# Patient Record
Sex: FEMALE | Race: WHITE | URBAN - METROPOLITAN AREA
[De-identification: names, ages, dates, MRNs, and addresses within clinical notes are randomized per-mention and may not be internally consistent; named-entity substitution may affect disease eponyms.]

---

## 2023-08-20 DIAGNOSIS — N63.21 BREAST LUMP ON LEFT SIDE AT 2 O'CLOCK POSITION: Primary | ICD-10-CM

## 2023-08-20 PROBLEM — N64.4 MASTODYNIA OF LEFT BREAST: Status: ACTIVE | Noted: 2023-08-20

## 2023-08-20 PROBLEM — N60.02 CYST OF LEFT BREAST: Status: ACTIVE | Noted: 2023-08-20

## 2023-08-20 PROBLEM — Z80.3 FAMILY HISTORY OF MALIGNANT NEOPLASM OF BREAST: Status: ACTIVE | Noted: 2023-08-20

## 2023-08-21 NOTE — PROGRESS NOTES
[facility]  MD Shiela Short, NP-C    Chief Complaint:   Darlene Wade is a 57 y.o. female presenting today for  6 month follow up. She is due for Mammogram and Ultrasound  She reports no interval changes on her self-breast examination.     History of Present Illness:   Mrs. Satterley first presented on February 22, 2023 with a left breast lump that on imaging was found to be consistent with benign fat necorsis and the recommendation was made to follow the area conservatively. She was also noted with a benign cyst. MD::: Stacy Duckworth MD.    Past Medical History:   Diagnosis Date    Breast lump on left side at 2 o'clock position 8/20/2023    Cyst of left breast 8/20/2023    Family history of malignant neoplasm of breast 8/20/2023    Mastodynia of left breast 8/20/2023      History reviewed. No pertinent surgical history.     Current Outpatient Medications:     busPIRone (BUSPAR) 5 MG Tab, Take 5-10 mg by mouth 2 (two) times daily as needed., Disp: , Rfl:     levothyroxine (SYNTHROID) 50 MCG tablet, Take 50 mcg by mouth every morning., Disp: , Rfl:     LO LOESTRIN FE 1 mg-10 mcg (24)/10 mcg (2) Tab, Take 1 tablet by mouth., Disp: , Rfl:    Review of patient's allergies indicates:   Allergen Reactions    Sulfa (sulfonamide antibiotics) Rash      Social History     Tobacco Use    Smoking status: Never    Smokeless tobacco: Never   Substance Use Topics    Alcohol use: Not on file      Family History   Problem Relation Age of Onset    Parkinsonism Mother     Breast cancer Mother 82    Ovarian cancer Neg Hx         Review of Systems   Integumentary:  Negative for color change, rash, mole/lesion, breast mass, breast discharge and breast tenderness.   Breast: Negative for mass and tenderness       Physical Exam   HENT:   Head: Normocephalic.   Pulmonary/Chest: Right breast exhibits no inverted nipple, no mass, no nipple discharge, no skin change and no  tenderness. Left breast exhibits no inverted nipple, no mass, no nipple discharge, no skin change and no tenderness. No breast swelling.   Genitourinary: No breast swelling.   Musculoskeletal: Lymphadenopathy:      Upper Body:      Right upper body: No supraclavicular or axillary adenopathy.      Left upper body: No supraclavicular or axillary adenopathy.     Neurological: She is alert.        MAMMOGRAM REPORT: The patient has had left breast core biopsies.There are scattered fibroglandular elements noted. Scattered, circumscribed, benign-appearing masses are demonstrated. Benign-type calcifications are identified. No spiculated mass, architectural distortion, or suspicious calcifications are identified in either breast.         Ultrasound: Left: Targeted ultrasound of the 2 o'clock N7 position of the left breast was performed for follow-up. The previously seen area of increased echogenicity is no longer visualized. No discrete solid or cystic lesion is identified in the area. IMPRESSION: No mammographic evidence of malignancy in either breast. Unremarkable targeted ultrasound of the 2 o'clock N7 position of the left breast. Follow-up mammography is recommended in one year.      NOTE:::We viewed her films together at today's visit.  We discussed the multiple views obtained and the important findings.  Even benign changes were mentioned and her questions were answered.  She knows that she may receive a formal letter or report from the Radiologist.  She is to contact us if she has questions.        Assessment/Plan  1. Breast lump on left side at 2 o'clock position  Assessment & Plan:  We reviewed our findings today and her questions were answered.  She understands that her imaging and exams have remained stable (and show nothing concerning).  She is comfortable being followed in a conservative fashion.      She understands the importance of monthly self-breast examination and knows to report any and all changes as  they occur.        2. Cyst of left breast  Assessment & Plan:  We discussed our Fibrocystic Mastopathy Protocol in detail. She should take Vitamin E 800 IU everyday x 3 months or until non-tender then can stop Vitamin E vs. continue daily at 400 IU.  The use of ice packs or warms soaks to tender area of the breast may also be of some benefit.  If warm soaks help her tenderness - She can use Aspercreme (unless allergic to Aspirin) on the affected area.  Ibuprofen (if no contraindications) at 800 mg three times per day for 5 days can also relieve many symptoms associated with swollen or inflamed tissue.  She can repeat Ibuprofen for 5 days, but then should be off for 5 days as it may cause gastric upset.  It is a good idea to wear a tight bra during the day and night to minimize movement of the tender area (Sports Bras work well).  Evening Primrose Oil can be bought over the counter and used at a dose of 3000 mg per day to help with any breast pain/tenderness not improved by implementing the above measures.        3. Mastodynia of left breast  Assessment & Plan:  We discussed our fibrocystic mastopathy protocol in detail. She knows that if she follows this protocol - that her symptoms should improve.  We discussed how breast pain is usually not associated with breast cancer, however, pain can be the presenting symptom with some cancers (but this could be coincidental). Still, if her pain does not improve in 8-12 weeks she should call us back for additional recommendations.        4. Family history of malignant neoplasm of breast  Assessment & Plan:  We discussed her family history and how it could impact her own future risks.  We discussed family vs. genetic history and the importance and implications of each.  Genetic Counseling/Testing was offered, and all questions answered to her satisfaction.  She knows that as additional family members are diagnosed - she will need to let us know as this may change follow up and  imaging recommendations.    We had a discussion concerning Breast Cancer Risk Reduction and current NCCN Guidelines. She knows that her risk can be lowered slightly with a healthy lifestyle and minimal ETOH use. Being physically active will also help. She should reduce or stay away from OCPs and HRT as possible.   Long discussion with her about her family history and the increased risk that is attributable.  We discussed Genetic Counseling/Testing at length and information was given to address these.   Her Lifetime Risk is only 13.48% based on the [employer] Tool. The population risk is 8.15%- so she not at a very high risk.   Her family and personal history were confirmed, and I believe this risk to be true based on its limitations.  She knows that there are certain elements that increase/decrease her risk that are not amendable to .  This risk is less than 20% and does not meet the threshold for additional annual imaging with MRI.  She will also not need to be followed in the High-Risk setting and can be seen on an annual basis.             Medical Decision Making:  It is my impression that this patient suffers all conditions contained in this medical document.  Each of these conditions did affect our plan of care and my medical decision making today.  It is my opinion that the medical decision making concerning this patient was of moderate difficulty based on the aforementioned conditions.  Any further recommendations will be communicated to the patient by me.  I have reviewed and verified her allergies, list of medications, medical and surgical histories, social history, and a pertinent review of symptoms.      Follow up:  1 year and prn    For:  OANH SALCIDO) at

## 2023-08-21 NOTE — ASSESSMENT & PLAN NOTE
We discussed her family history and how it could impact her own future risks.  We discussed family vs. genetic history and the importance and implications of each.  Genetic Counseling/Testing was offered, and all questions answered to her satisfaction.  She knows that as additional family members are diagnosed - she will need to let us know as this may change follow up and imaging recommendations.    We had a discussion concerning Breast Cancer Risk Reduction and current NCCN Guidelines. She knows that her risk can be lowered slightly with a healthy lifestyle and minimal ETOH use. Being physically active will also help. She should reduce or stay away from OCPs and HRT as possible.   Long discussion with her about her family history and the increased risk that is attributable.  We discussed Genetic Counseling/Testing at length and information was given to address these.   Her Lifetime Risk is only 13.48% based on the [employer] Tool. The population risk is 8.15%- so she not at a very high risk.   Her family and personal history were confirmed, and I believe this risk to be true based on its limitations.  She knows that there are certain elements that increase/decrease her risk that are not amendable to .  This risk is less than 20% and does not meet the threshold for additional annual imaging with MRI.  She will also not need to be followed in the High-Risk setting and can be seen on an annual basis.

## 2023-08-31 ENCOUNTER — OFFICE VISIT (OUTPATIENT)
Dept: SURGERY | Facility: CLINIC | Age: 58
End: 2023-08-31
Payer: MEDICAID

## 2023-08-31 VITALS — HEIGHT: 61 IN | BODY MASS INDEX: 24.55 KG/M2 | WEIGHT: 130 LBS

## 2023-08-31 DIAGNOSIS — Z80.3 FAMILY HISTORY OF MALIGNANT NEOPLASM OF BREAST: ICD-10-CM

## 2023-08-31 DIAGNOSIS — N64.4 MASTODYNIA OF LEFT BREAST: ICD-10-CM

## 2023-08-31 DIAGNOSIS — N63.21 BREAST LUMP ON LEFT SIDE AT 2 O'CLOCK POSITION: ICD-10-CM

## 2023-08-31 DIAGNOSIS — N60.02 CYST OF LEFT BREAST: ICD-10-CM

## 2023-08-31 PROCEDURE — 3008F PR BODY MASS INDEX (BMI) DOCUMENTED: ICD-10-PCS | Mod: CPTII,S$GLB,, | Performed by: NURSE PRACTITIONER

## 2023-08-31 PROCEDURE — 1160F RVW MEDS BY RX/DR IN RCRD: CPT | Mod: CPTII,S$GLB,, | Performed by: NURSE PRACTITIONER

## 2023-08-31 PROCEDURE — 1159F PR MEDICATION LIST DOCUMENTED IN MEDICAL RECORD: ICD-10-PCS | Mod: CPTII,S$GLB,, | Performed by: NURSE PRACTITIONER

## 2023-08-31 PROCEDURE — 99213 PR OFFICE/OUTPT VISIT, EST, LEVL III, 20-29 MIN: ICD-10-PCS | Mod: S$GLB,,, | Performed by: NURSE PRACTITIONER

## 2023-08-31 PROCEDURE — 99213 OFFICE O/P EST LOW 20 MIN: CPT | Mod: S$GLB,,, | Performed by: NURSE PRACTITIONER

## 2023-08-31 PROCEDURE — 1159F MED LIST DOCD IN RCRD: CPT | Mod: CPTII,S$GLB,, | Performed by: NURSE PRACTITIONER

## 2023-08-31 PROCEDURE — 1160F PR REVIEW ALL MEDS BY PRESCRIBER/CLIN PHARMACIST DOCUMENTED: ICD-10-PCS | Mod: CPTII,S$GLB,, | Performed by: NURSE PRACTITIONER

## 2023-08-31 PROCEDURE — 3008F BODY MASS INDEX DOCD: CPT | Mod: CPTII,S$GLB,, | Performed by: NURSE PRACTITIONER

## 2023-08-31 RX ORDER — LEVOTHYROXINE SODIUM 50 UG/1
50 TABLET ORAL EVERY MORNING
COMMUNITY
Start: 2023-08-15

## 2023-08-31 RX ORDER — NORETHINDRONE ACETATE AND ETHINYL ESTRADIOL, ETHINYL ESTRADIOL AND FERROUS FUMARATE 1MG-10(24)
1 KIT ORAL
COMMUNITY
Start: 2023-08-14

## 2023-08-31 RX ORDER — BUSPIRONE HYDROCHLORIDE 5 MG/1
5-10 TABLET ORAL 2 TIMES DAILY PRN
COMMUNITY
Start: 2023-08-15

## 2024-06-20 ENCOUNTER — CLINICAL SUPPORT (OUTPATIENT)
Dept: AUDIOLOGY | Facility: HOSPITAL | Age: 59
End: 2024-06-20
Payer: MEDICAID

## 2024-06-20 DIAGNOSIS — H81.90 DISORDER OF VESTIBULAR FUNCTION, UNSPECIFIED LATERALITY: Primary | ICD-10-CM

## 2024-06-20 PROCEDURE — 92567 TYMPANOMETRY: CPT | Performed by: AUDIOLOGIST-HEARING AID FITTER

## 2024-06-20 PROCEDURE — 92537 CALORIC VSTBLR TEST W/REC: CPT | Performed by: AUDIOLOGIST-HEARING AID FITTER

## 2024-06-20 PROCEDURE — 92540 BASIC VESTIBULAR EVALUATION: CPT | Performed by: AUDIOLOGIST-HEARING AID FITTER

## 2024-06-20 NOTE — PROGRESS NOTES
Vestibular Evaluation      Patient is a 58 year old female presenting with symptoms of episodic vertigo.  Mrs. Wade reports the onset of symptoms began approximately 8 weeks prior due to unknown etiology.  The initial attack is described as severe vertigo, nausea and syncopal sensation which occurred suddenly and without warning.  The duration of this initial episode occurred for hours. Mrs. Wade reports the episodes have since diminished in frequency and severity.  She may experience slight vertigo with changes in position and/or head movement which may last only seconds.   A change in hearing and/or tinnitus prior or during an attack has also been denied at this time.  She also denies a recent change in medication and/or new medical diagnosis. Of interest, Mrs. Wade denies any symptoms until experiencing a major life event (I.e. death of her mother).       Otoscopy revealed clear EACs, bilaterally.   Tympanometry testing revealed a Type As tympanogram for the right ear indicative of slightly, reduced mobility of the TM; a Type A tympanogram was noted for the left ear indicative of normal middle ear function.      Vestibular Results:    Spontaneous: No spontaneous nystagmus noted for vision and vision denied condition.    Gaze:   No gaze-evoked nystagmus.    Saccades:   Normal saccade velocity, latency and accuracy.   Tracking: Normal tracking accuracy and gain.  OPK:    No asymmetry noted.   Positionals: No positional nystagmus noted for all test conditions with vision denied.  Positioning: No paroxysmal positional nystagmus noted.   Calorics: Within normal limits    Caloric vestibular responses: RC 15, RW 15, LW 12, LC 12 deg/s.      Interpretations:      This patient's videonystagmogram was normal.  Oculomotor testing was within normal limits. Positional testing did not reveal any nystagmus for all test conditions with vision denied. Positioning testing was negative for canalith involvement,  bilaterally.  The caloric irrigation test revealed symmetrical responses for air irrigations, bilaterally. Todays findings indicative of normal peripheral function, bilaterally.      Recommendations:        Return to referring ENT for further follow up       Qaun Link.  Clinical Audiologist

## 2024-08-26 DIAGNOSIS — N63.21 BREAST LUMP ON LEFT SIDE AT 2 O'CLOCK POSITION: Primary | ICD-10-CM

## 2024-09-03 NOTE — PROGRESS NOTES
[facility]  Anam Jolly MD, FACS  Shiela Thomas NP-YA      Date of Service: 9/4/2024      Chief Complaint:   Darlene Wade is a 58 y.o. female presenting today for her annual evaluation.  She is due for a mammogram. She reports no interval changes.     History of Present Illness:   Mrs. Satterley first presented on February 22, 2023 with a left breast lump that on imaging was found to be consistent with benign fat necorsis and the recommendation was made to follow the area conservatively. She was also noted with a benign cyst. MD::: Stacy Duckworth MD.     Past Medical History:   Diagnosis Date    Breast lump on left side at 2 o'clock position 8/20/2023    Cyst of left breast 8/20/2023    Family history of malignant neoplasm of breast 8/20/2023    Mastodynia of left breast 8/20/2023      Past Surgical History:   Procedure Laterality Date    BREAST BIOPSY Left         Current Outpatient Medications:     busPIRone (BUSPAR) 5 MG Tab, Take 5-10 mg by mouth 2 (two) times daily as needed., Disp: , Rfl:     levothyroxine (SYNTHROID) 50 MCG tablet, Take 50 mcg by mouth every morning., Disp: , Rfl:     LO LOESTRIN FE 1 mg-10 mcg (24)/10 mcg (2) Tab, Take 1 tablet by mouth., Disp: , Rfl:    Review of patient's allergies indicates:   Allergen Reactions    Sulfa (sulfonamide antibiotics) Rash      Social History     Tobacco Use    Smoking status: Never    Smokeless tobacco: Never   Substance Use Topics    Alcohol use: Not on file      Family History   Problem Relation Name Age of Onset    Parkinsonism Mother Kailee Wade     Breast cancer Mother Kailee Wade 82    Ovarian cancer Neg Hx          Review of Systems   Integumentary:  Negative for color change, rash, mole/lesion, breast mass, breast discharge and breast tenderness.   Breast: Negative for mass and tenderness       Physical Exam   HENT:   Head: Normocephalic.   Pulmonary/Chest: Right breast exhibits no inverted  nipple, no mass, no nipple discharge, no skin change and no tenderness. Left breast exhibits no inverted nipple, no mass, no nipple discharge, no skin change and no tenderness. No breast swelling.   Genitourinary: No breast swelling.   Musculoskeletal: Lymphadenopathy:      Upper Body:      Right upper body: No supraclavicular or axillary adenopathy.      Left upper body: No supraclavicular or axillary adenopathy.     Neurological: She is alert.        MAMMOGRAM REPORT:FINDINGS: There are scattered fibroglandular elements noted. The patient has undergone core biopsy on the left. There are no suspicious masses or suspicious calcifications seen to suggest malignancy. Scattered, circumscribed, benign-appearing masses are demonstrated.  IMPRESSION: No evidence of malignancy. No significant change when compared to previous exam. Follow-up mammography is recommended in one year.  ASSESSMENT and PLAN OF CARE     1. Breast lump on left side at 2 o'clock position  Assessment & Plan:  We reviewed our findings today and her questions were answered.  She understands that her imaging and exams have remained stable (and show nothing concerning).  She is comfortable being followed in a conservative fashion.      She understands the importance of monthly self-breast examination and knows to report any and all changes as they occur.        2. Cyst of left breast  Assessment & Plan:  We discussed our Fibrocystic Mastopathy Protocol in detail. She should take Vitamin E 800 IU everyday x 3 months or until non-tender then can stop Vitamin E vs. continue daily at 400 IU.  The use of ice packs or warms soaks to tender area of the breast may also be of some benefit.  If warm soaks help her tenderness - She can use Aspercreme (unless allergic to Aspirin) on the affected area.  Ibuprofen (if no contraindications) at 800 mg three times per day for 5 days can also relieve many symptoms associated with swollen or inflamed tissue.  She can  repeat Ibuprofen for 5 days, but then should be off for 5 days as it may cause gastric upset.  It is a good idea to wear a tight bra during the day and night to minimize movement of the tender area (Sports Bras work well).  Evening Primrose Oil can be bought over the counter and used at a dose of 3000 mg per day to help with any breast pain/tenderness not improved by implementing the above measures.        3. Mastodynia of left breast  Assessment & Plan:  We discussed our fibrocystic mastopathy protocol in detail. She knows that if she follows this protocol - that her symptoms should improve.  We discussed how breast pain is usually not associated with breast cancer, however, pain can be the presenting symptom with some cancers (but this could be coincidental). Still, if her pain does not improve in 8-12 weeks she should call us back for additional recommendations.        4. Family history of malignant neoplasm of breast  Assessment & Plan:  We discussed her family history and how it could impact her own future risks.  We discussed family vs. genetic history and the importance and implications of each.  Genetic Counseling/Testing was offered, and all questions answered to her satisfaction.  She knows that as additional family members are diagnosed - she will need to let us know as this may change follow up and imaging recommendations.    We had a discussion concerning Breast Cancer Risk Reduction and current NCCN Guidelines. She knows that her risk can be lowered slightly with a healthy lifestyle and minimal ETOH use. Being physically active will also help. She should reduce or stay away from OCPs and HRT as possible.   Long discussion with her about her family history and the increased risk that is attributable.  We discussed Genetic Counseling/Testing at length and information was given to address these.   Her Lifetime Risk is only 13.48% based on the [employer] Tool. The population risk is 8.15%- so she not at  a very high risk.   Her family and personal history were confirmed, and I believe this risk to be true based on its limitations.  She knows that there are certain elements that increase/decrease her risk that are not amendable to .  This risk is less than 20% and does not meet the threshold for additional annual imaging with MRI.  She will also not need to be followed in the High-Risk setting and can be seen on an annual basis.      Medical Decision Making: It is my impression that this patient suffers all conditions contained in this medical document.  Each of these conditions did affect our plan of care and my medical decision making today.  It is my opinion that the medical decision making concerning this patient was of minimal  difficulty based on the aforementioned conditions.  Any further recommendations will be communicated to the patient by me.  I have reviewed and verified her allergies, list of medications, medical and surgical histories, social history, and a pertinent review of symptoms.     Follow up: 1 year and PRN    For: Physical Examination and MGM (D) at

## 2024-09-04 ENCOUNTER — OFFICE VISIT (OUTPATIENT)
Dept: SURGERY | Facility: CLINIC | Age: 59
End: 2024-09-04
Payer: MEDICAID

## 2024-09-04 DIAGNOSIS — N64.4 MASTODYNIA OF LEFT BREAST: ICD-10-CM

## 2024-09-04 DIAGNOSIS — N60.02 CYST OF LEFT BREAST: ICD-10-CM

## 2024-09-04 DIAGNOSIS — Z80.3 FAMILY HISTORY OF MALIGNANT NEOPLASM OF BREAST: ICD-10-CM

## 2024-09-04 DIAGNOSIS — N63.21 BREAST LUMP ON LEFT SIDE AT 2 O'CLOCK POSITION: Primary | ICD-10-CM

## 2024-09-04 PROCEDURE — 1160F RVW MEDS BY RX/DR IN RCRD: CPT | Mod: CPTII,,, | Performed by: NURSE PRACTITIONER

## 2024-09-04 PROCEDURE — 99999 PR PBB SHADOW E&M-EST. PATIENT-LVL II: CPT | Mod: PBBFAC,,, | Performed by: NURSE PRACTITIONER

## 2024-09-04 PROCEDURE — 1159F MED LIST DOCD IN RCRD: CPT | Mod: CPTII,,, | Performed by: NURSE PRACTITIONER

## 2024-09-04 PROCEDURE — 99213 OFFICE O/P EST LOW 20 MIN: CPT | Mod: S$PBB,,, | Performed by: NURSE PRACTITIONER

## 2024-09-04 PROCEDURE — 99212 OFFICE O/P EST SF 10 MIN: CPT | Mod: PBBFAC,PN | Performed by: NURSE PRACTITIONER

## 2025-05-29 ENCOUNTER — HOSPITAL ENCOUNTER (EMERGENCY)
Facility: HOSPITAL | Age: 60
Discharge: HOME | End: 2025-05-29
Attending: EMERGENCY MEDICINE

## 2025-05-29 VITALS
TEMPERATURE: 98 F | RESPIRATION RATE: 19 BRPM | DIASTOLIC BLOOD PRESSURE: 92 MMHG | HEIGHT: 61 IN | SYSTOLIC BLOOD PRESSURE: 161 MMHG | WEIGHT: 135 LBS | OXYGEN SATURATION: 99 % | HEART RATE: 90 BPM | BODY MASS INDEX: 25.49 KG/M2

## 2025-05-29 DIAGNOSIS — S46.911A SHOULDER STRAIN, RIGHT, INITIAL ENCOUNTER: Primary | ICD-10-CM
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
## 2025-05-29 PROCEDURE — 99284 EMERGENCY DEPT VISIT MOD MDM: CPT | Mod: 25

## 2025-05-29 RX ORDER — METHOCARBAMOL 500 MG/1
500 TABLET, FILM COATED ORAL 3 TIMES DAILY
Qty: 15 TABLET | Refills: 0 | Status: SHIPPED | OUTPATIENT
Start: 2025-05-29 | End: 2025-06-03

## 2025-05-29 RX ORDER — DICLOFENAC SODIUM 75 MG/1
75 TABLET, DELAYED RELEASE ORAL 2 TIMES DAILY
Qty: 14 TABLET | Refills: 0 | Status: SHIPPED | OUTPATIENT
Start: 2025-05-29 | End: 2025-06-05

## 2025-05-29 RX ORDER — LIDOCAINE 50 MG/G
1 PATCH TOPICAL DAILY
Qty: 5 PATCH | Refills: 0 | Status: SHIPPED | OUTPATIENT
Start: 2025-05-29 | End: 2025-06-03

## 2025-06-16 ENCOUNTER — OFFICE VISIT (OUTPATIENT)
Dept: ORTHOPEDICS | Facility: CLINIC | Age: 60
End: 2025-06-16

## 2025-06-16 ENCOUNTER — HOSPITAL ENCOUNTER (OUTPATIENT)
Dept: RADIOLOGY | Facility: HOSPITAL | Age: 60
Discharge: HOME | End: 2025-06-16

## 2025-06-16 VITALS
HEART RATE: 84 BPM | SYSTOLIC BLOOD PRESSURE: 126 MMHG | BODY MASS INDEX: 25.48 KG/M2 | DIASTOLIC BLOOD PRESSURE: 64 MMHG | HEIGHT: 61 IN | WEIGHT: 134.94 LBS

## 2025-06-16 DIAGNOSIS — M75.101 TEAR OF RIGHT ROTATOR CUFF, UNSPECIFIED TEAR EXTENT, UNSPECIFIED WHETHER TRAUMATIC: ICD-10-CM

## 2025-06-16 DIAGNOSIS — M25.511 ACUTE PAIN OF RIGHT SHOULDER: Primary | ICD-10-CM

## 2025-06-16 DIAGNOSIS — S46.911A SHOULDER STRAIN, RIGHT, INITIAL ENCOUNTER: ICD-10-CM

## 2025-06-16 PROCEDURE — 99213 OFFICE O/P EST LOW 20 MIN: CPT | Mod: PBBFAC

## 2025-06-16 RX ORDER — MELOXICAM 7.5 MG/1
7.5 TABLET ORAL 2 TIMES DAILY PRN
Qty: 60 TABLET | Refills: 1 | Status: SHIPPED | OUTPATIENT
Start: 2025-06-16 | End: 2025-07-16

## 2025-06-16 RX ORDER — DICLOFENAC SODIUM 10 MG/G
2 GEL TOPICAL 4 TIMES DAILY
Qty: 100 G | Refills: 3 | Status: SHIPPED | OUTPATIENT
Start: 2025-06-16

## 2025-06-16 RX ORDER — GABAPENTIN 100 MG/1
100 CAPSULE ORAL 3 TIMES DAILY
COMMUNITY
Start: 2025-04-21

## 2025-06-16 NOTE — LETTER
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  Although a sedentary job is defined as one which involved sitting, a certain amount of walking and standing are required only occasionally and other sedentary criteria are met.    [_] LIGHT WORK: Lifting 20 pounds with frequent lifting and/or carrying objects weighing up to 10 pounds.  Even though the weight lifted may be only a negotiable amount, a job is in the category when it involves sitting most of the time with a degree of pushing/pulling of arm and/or leg controls.    [_] MEDIUM WORK: Lifting of 50 pounds maximum with frequent lifting and/or carrying of objects up to 25 pounds.    [_] HEAVY WORK: Lifting of 100 pounds maximum with frequent lifting and/or carrying objects up to 50 pounds.    [_] VERY HEAVY WORK: Lifting objects in excess of 100 pounds with frequent lifting and/or carrying of objects weighing 50 pounds or more.    [_] REGULAR DUTY: [_] No Restrictions. [_] With Restrictions (See comments below0:    COMMENTS- Patient will be out of work for about 3 weeks or until next evaluation.

## 2025-06-16 NOTE — PROGRESS NOTES
"Subjective:   Patient ID: Darlene Wade is a right handed 59 y.o. female  who presented to [facility] for new visit.    Chief Complaint: Pain of the Right Shoulder    History of Present Illness:  Darlene Wade presents to the clinic today for right shoulder pain after a mechanical fall protecting her dog from another dog, DOI: 5/29/25. Pain is 4/10 and located at globally. Quality of pain is described as aching, dull, sharp, and stabbing.  Inciting event: mechanical fall, DOI: 5/29/25. Pain is aggravated by all activities, ADL's, work at or above shoulder height, difficulty sleeping on affected side. Patient has had no prior shoulder problems. Evaluation to date: plain films and ER evaluation. Treatment to date: avoidance of activity and oral analgesics (diclofenac po, tylenol, baclofen QHS). Expectations for today's visit: further evaluation. Occupation:  at [employer].     Shoulder Review of Systems:  Swelling?  no  Instability?  no  Clicking?  no  Limited ROM? yes  Fever/Chills? no  Subluxation? no  Dislocation? no      Objective:     Physical Exam:  /64   Pulse 84   Ht 5' 1" (1.549 m)   Wt 61.2 kg (134 lb 14.7 oz)   BMI 25.49 kg/m²     Appearance:  Soft tissue swelling: Left: no Right: no  Effusion: Left:  Negative Right: Negative  Erythema: Left no Right: no  Ecchymosis: Left: no Right: no  Atrophy: Left: no Right: yes  Scapular winging: Left: no Right: no    Palpation:  Shoulder Tenderness: Left: none  Right: supraspinatus, infraspinatus    Range of motion:  Flexion (0-90): Left:  90 Right: active 30, passive 90  Abduction (0-180): Left:  180 Right: active 30, passive 100  External rotation (0-55): Left: 55 Right: 55  Internal rotation (0-45): Left: 45 Right: 45    Strength:  Abduction: Left: 5/5 Pain: no Right: 3/5 Pain: yes  External rotation: Left: 5/5 Pain: no Right: 4/5 Pain: yes  Internal rotation: Left: 5/5 Pain: no Right: 5/5 Pain: " no  Elbow flexion: Left: 5/5 Pain: no Right: 5/5 Pain: no  Elbow extension: Left: 5/5 Pain: no Right: 5/5 Pain: no    Special Tests:  Subacromial Impingement  Neer: Left: Negative Right: Positive  Motta: Left: Negative Right: Positive    AC Joint Arthritis:  Cross-body abduction: Left: Negative Right: Positive    Rotator Cuff Tear   Belly press test: Left: Negative Right: Negative  Cecilio test (Empty can): Left: Negative Right: Positive    Stability   Sulcus sign: Left: Not performed Right: Not performed   Apprehension test: Left: Not performed Right: Not performed   Relocation test: Left: Not performed Right: Not performed     Cervical   Spurling: Left: Negative Right: Negative    AIN/PIN/Ulnar nerve: Intact and symmetric    General appearance: NAD  Peripheral pulses: normal bilaterally   Reflexes: Left: Not performed Right: Not performed   Sensation: normal    Labs:  Last A1c: The patient doesn't have any registry metric data available     Imaging:   Previous images reviewed.  X-rays ordered and performed today: no  Right shoulder XR on 05/29/2025, My Interpretation:  No acute fractures or dislocations noted, humeral head slightly high-riding, AC joint space narrowing     Assessment:     Encounter Diagnoses   Code Name Primary?    M25.511 Acute pain of right shoulder Yes    M75.101 Tear of right rotator cuff, unspecified tear extent, unspecified whether traumatic        Plan:      MDM: Prior external referring provider notes reviewed. Prior external referring provider studies reviewed.   Dx:  Acute right shoulder pain suspect supraspinatus tear - New problem that needs additional work up  Treatment Plan: Discussed with patient diagnosis and treatment recommendations.  Discussed with the patient suspect rotator cuff tear specifically the supraspinatus due to acute trauma and limited ROM of abduction on exam.  We will have the patient continue with oral analgesics and topical analgesics.  Patient is self-pay at this  time recommend applying for financial assistance.  If patient has continued limited ROM and pain we will likely order MRI prior to next visit to further evaluate for rotator cuff tear/surgical planning.  She will then follow up after MRI imaging is ordered to review results and discuss next steps and plan.  May consider CSI in the future if appropriate vs surgical evaluation.  We will eventually like the patient to complete formal PT as well, we will start with HEP at this time.  Imaging: prior radiological studies independently reviewed; discussed with patient; agree with radiologist interpretation.   Procedure: Discussed injection as a treatment option; discussed injections as treatment options in future if conservative measures do not improve symptoms.  Therapy: HEP  Medication: START Voltaren Gel 1% as prescribed to affected area  START meloxicam (Mobic 15 mg daily). Please see your primary care physician for further refills.  RTC:  Follow up after MRI.       This note is dictated using the M*Modal Fluency Direct word recognition program. There are word recognition mistakes that are occasionally missed on review.     Clarice Akins MD  Sports Medicine Fellow

## 2025-06-23 ENCOUNTER — TELEPHONE (OUTPATIENT)
Dept: ORTHOPEDICS | Facility: CLINIC | Age: 60
End: 2025-06-23

## 2025-06-23 NOTE — TELEPHONE ENCOUNTER
----- Message from Tali sent at 6/23/2025  9:31 AM CDT -----  Regarding: MRI RIGHT SHOULDER PROCEDURE CODE  Patient called asking for a procedure code for mri right shoulder. She has no insurance and needs a price.  Thank you

## 2025-06-24 ENCOUNTER — PATIENT MESSAGE (OUTPATIENT)
Dept: SURGERY | Facility: CLINIC | Age: 60
End: 2025-06-24

## 2025-07-17 ENCOUNTER — TELEPHONE (OUTPATIENT)
Dept: ORTHOPEDICS | Facility: CLINIC | Age: 60
End: 2025-07-17
Payer: COMMERCIAL

## 2025-07-21 DIAGNOSIS — S46.011D TRAUMATIC ROTATOR CUFF TEAR, RIGHT, SUBSEQUENT ENCOUNTER: Primary | ICD-10-CM

## 2025-07-21 NOTE — TELEPHONE ENCOUNTER
Patient came into the clinic stating that she has continued right shoulder pain.  We will order right shoulder MRI as discussed in previous note on 06/16/2025.  We will have the patient follow up after imaging is complete.    Dr. Akins

## 2025-07-30 ENCOUNTER — TELEPHONE (OUTPATIENT)
Dept: ORTHOPEDICS | Facility: CLINIC | Age: 60
End: 2025-07-30
Payer: COMMERCIAL

## 2025-07-30 NOTE — TELEPHONE ENCOUNTER
----- Message from Vivian sent at 7/30/2025  9:19 AM CDT -----  Regarding: MRI denied by insur  PROVIDER:Dr. Encinas/ Dr. Akins    MESSAGE: Patient states that her MRI was scheduled for today but got cx due to insurance denying the MRI. She states that she is still in pain even with Meloxicam and muscle relaxer with minimal relief. She wants a call back to know what she should do next.     #: [phone]

## 2025-08-06 NOTE — TELEPHONE ENCOUNTER
Patient scheduled with Dr. Akins on 08/07/25 @ 8:30am. Patient would rather see Dr. Akins instead of Dr. Encinas tomorrow

## 2025-08-07 ENCOUNTER — OFFICE VISIT (OUTPATIENT)
Dept: ORTHOPEDICS | Facility: CLINIC | Age: 60
End: 2025-08-07
Payer: COMMERCIAL

## 2025-08-07 ENCOUNTER — TELEPHONE (OUTPATIENT)
Dept: ORTHOPEDICS | Facility: CLINIC | Age: 60
End: 2025-08-07

## 2025-08-07 VITALS
HEIGHT: 61 IN | SYSTOLIC BLOOD PRESSURE: 123 MMHG | BODY MASS INDEX: 25.48 KG/M2 | HEART RATE: 84 BPM | WEIGHT: 134.94 LBS | DIASTOLIC BLOOD PRESSURE: 79 MMHG | TEMPERATURE: 98 F | OXYGEN SATURATION: 99 %

## 2025-08-07 DIAGNOSIS — S46.011D TRAUMATIC ROTATOR CUFF TEAR, RIGHT, SUBSEQUENT ENCOUNTER: Primary | ICD-10-CM

## 2025-08-07 PROCEDURE — 99213 OFFICE O/P EST LOW 20 MIN: CPT | Mod: PBBFAC

## 2025-08-07 RX ORDER — IBUPROFEN 800 MG/1
800 TABLET, FILM COATED ORAL 3 TIMES DAILY
Qty: 90 TABLET | Refills: 1 | Status: SHIPPED | OUTPATIENT
Start: 2025-08-07

## 2025-08-07 RX ORDER — LIDOCAINE HYDROCHLORIDE 10 MG/ML
5 INJECTION, SOLUTION EPIDURAL; INFILTRATION; INTRACAUDAL; PERINEURAL
Status: CANCELLED | OUTPATIENT
Start: 2025-08-07 | End: 2025-08-07

## 2025-08-07 RX ORDER — METHYLPREDNISOLONE ACETATE 40 MG/ML
40 INJECTION, SUSPENSION INTRA-ARTICULAR; INTRALESIONAL; INTRAMUSCULAR; SOFT TISSUE
Status: CANCELLED | OUTPATIENT
Start: 2025-08-07 | End: 2025-08-07

## 2025-08-07 RX ORDER — IBUPROFEN 800 MG/1
800 TABLET, FILM COATED ORAL 3 TIMES DAILY
Qty: 90 TABLET | Refills: 1 | Status: SHIPPED | OUTPATIENT
Start: 2025-08-07 | End: 2025-08-07

## 2025-08-07 NOTE — PROGRESS NOTES
Sports Medicine Clinic   [facility]                                                   SUBJECTIVE     Patient: Darlene Wade is a 59 y.o. female.   Patient Type: Existing Sports Medicine Clinic patient   Insurance Carrier: Private    Chief Complaint: Right shoulder pain - globally stabbing and sharp         HPI:  Presents to the clinic for follow up evaluation of her global right shoulder pain after a mechanical fall protecting her dog from another dog, DOI: 5/29/25.  She rates the pain 8/10 and describes it as a stabbing, sharp, stabbing pain.  Since her last visit her range of motion and pain has increased when reaching above shoulder level.  She has to use her left hand to help push her right arm above shoulder height.  Due to the pain and decreased ROM she quit her job as a  at [employer] as she was unable to complete the work and make it through a day without excruciating pain.    Hand Dominance: Right dominant    Injury/ Surgical Hx r/t CC:  DOI 5/29/25, mechanism of injury mechanical fall   Onset: 2 months  worsening over time   Modifying Factors: Exacerbated by:  all activities, reaching, work at or above shoulder height, difficulty sleeping on affected side Improved with:   Nothing   Associated Symptoms:  [x] decreased ROM  [] swelling   [x] weakness  [] numbness/tingling [] crepitus  [] subluxation  [] dislocation  [] difficult sleeping s/t pain   Activity:  pain moderately interferes with ADLs    Previous Treatments:  [x] HEP > 6 weeks   [x] OTC Pain Relievers: Tylenol  [x] Rx Medications: topical diclofenac, meloxicam  [] CSI    Co-Morbid Conditions: None   NOTE: Employment Hx:  at [employer]     REVIEW OF SYSTEMS:  (+)    [] Fevers/Chills   [] Unexplained weight loss   [] Excessive fatigue   [] Night sweats     OBJECTIVE     Body mass index is 25.49 kg/m².   Vitals:    08/07/25 0826   BP: 123/79   Pulse: 84   Temp: 97.8 °F (36.6 °C)   SpO2: 99%   Weight: 61.2  "kg (134 lb 14.7 oz)   Height: 5' 1" (1.549 m)   PainSc:   8       General:  no apparent distress, no pain indicators,   Neuro/ Psych: Awake, alert, oriented, normal mood and affect  Neurovascular: Intact to light touch, no edema, peripheral pulse present    MSK Shoulder Exam:    INSPECTION:   LEFT Shoulder:  RIGHT Shoulder   [x] Normal    [] Erythema    [] Supraspinatus atrophy  [] Scapular Winging [] Rash  [] Contusion  [] Mass  [] Scars [] Normal    [] Erythema    [x] Supraspinatus atrophy  [] Scapular Winging [] Rash  [] Contusion  [] Mass  [] Scars     PALPATION  LEFT Shoulder RIGHT Shoulder   Joint Warmth: normal  [x] None  [] Crepitus  [] AC joint tenderness  [] Deltoid tenderness  [] Supraspinatus tenderness  [] Infraspinatus tenderness  [] Trapezius tenderness  [] Globally  Joint Warmth: normal  [] None  [] Crepitus  [] AC joint tenderness  [] Deltoid tenderness  [x] Supraspinatus tenderness  [x] Infraspinatus tenderness  [] Trapezius tenderness  [] Globally        ROM  Active Flexion / Abduction (0-180)  Left 0-180 Left 0-100       Internal (0-45)/External (0-55)   Yes No   Internal Rotation Limited [] [x]   External Rotation Limited [] [x]         STRENGTH  Flexion / Abduction    Pain (+)   Pain (+)   Left Flexion  5/5 []  Right Flexion  3/5 [x]   Left Abduction  5/5 []  Right Abduction  3/5 [x]   Left Internal Rotation  5/5 []  Right Internal Rotation  4/5 [x]   Left External Rotation  5/5 []  Right External Rotation  4/5 [x]       SPECIAL TESTING  Not   Tested Subacromial Impingement L(+) L(-)  R(+) R(-)   [] Cj [] [x]  [x] []   [] Motta [] [x]  [x] []             AC Joint Arthritis L(+) L(-)  R(+) R(-)   [] Cross-body abduction [] [x]  [x] []             Rotator Cuff Tear L(+) L(-)  R(+) R(-)   [] Belly press test [] [x]  [] [x]   [] Drop arm test [] [x]  [x] []   [x] Hornblower test [] []  [] []   [] Cecilio test (Empty can) [] [x]  [x] []             Biceps Tendon/  Labral Tear L(+) L(-)  R(+) R(-) "   [] Speed's [] [x]  [] [x]   [] Yergason's [] [x]  [] [x]   [] O'Maikel's [] [x]  [] [x]   [] Crank test [] [x]  [] [x]             Stability L(+) L(-)  R(+) R(-)   [] Sulcus sign [] [x]  [] [x]   [] Apprehension test [] [x]  [] [x]   [] Relocation test [] [x]  [] [x]             Cervical L(+) L(-)  R(+) R(-)   [] Spurlings [] [x]  [] [x]            AIN/PIN/Ulnar Nerve: Intact and symmetrical    Elbow Exam: normal  Wrist Exam: normal    DATA REVIEW     LABS:   Last A1C: The patient doesn't have any registry metric data available     IMAGING:   X-rays ordered and performed today: no  Right shoulder XR on 05/29/2025, My Interpretation:  No acute fractures or dislocations noted, humeral head slightly high-riding, AC joint space narrowing     ASSESSMENT     Encounter Diagnoses   Code Name Primary?    S46.011D Traumatic rotator cuff tear, right, subsequent encounter Yes       PLAN     DIAGNOSIS: Acute Right Rotator Cuff Tear -  Acute moderate exacerbation  Treatment plan:    Patient educated on surgical versus non-surgical options.  Patient verbalized understanding, agrees to plan and denies further questions.   Ongoing education about Dx, treatment recommendations and reasonable expectations including goal to decrease pain and increase function.  Discussed with patient diagnosis and treatment recommendations. Recommend conservative treatment to include: avoidance of aggravating activity, significant modification of daily activities, hot/cold therapies, topical and oral medications, braces, HEP/PT/OT, and injections.  Will also obtain further imaging, right shoulder MRI, as the patient has failed conservative management with >6wks of HEP and oral/topical analgesics.  We will have her follow up after imaging results.  Also discussed starting formal PT, paper script given to patient in office today.  Activity: Activity as tolerated; HEP to include aerobic conditioning and strength training with non-painful activity.  ROM/STG exercises.    Therapy: Physical Therapy (PT)  Exercise Prescription: daily HEP with Thera[employer]  Procedure:  May consider after further imaging if appropriate.  RX Medications: START ibuprofen 800 mg PRN  CONTINUE over-the-counter acetaminophen (Tylenol 1000 mg three times per day as needed)  CONTINUE Voltaren Gel 1% as prescribed  STOP all other NSAIDs Please see your primary care physician for further refills.  RTC: after imaging test complete    This note is dictated using the M*Modal Fluency Direct word recognition program. There are word recognition mistakes that are occasionally missed on review.     Clarice Akins MD  Sports Medicine

## 2025-08-07 NOTE — TELEPHONE ENCOUNTER
----- Message from Vivian sent at 8/7/2025  1:11 PM CDT -----  Regarding: RX  PROVIDER: Dr. Akins    MESSAGE: Patient states that her RX was suppose to go [facility] in [place]. It was sent to the wrong pharmacy.    #: [phone]

## 2025-08-11 ENCOUNTER — TELEPHONE (OUTPATIENT)
Dept: ORTHOPEDICS | Facility: CLINIC | Age: 60
End: 2025-08-11
Payer: COMMERCIAL

## 2025-08-12 ENCOUNTER — TELEPHONE (OUTPATIENT)
Dept: ORTHOPEDICS | Facility: CLINIC | Age: 60
End: 2025-08-12
Payer: COMMERCIAL

## 2025-08-20 ENCOUNTER — TELEPHONE (OUTPATIENT)
Dept: ORTHOPEDICS | Facility: CLINIC | Age: 60
End: 2025-08-20
Payer: COMMERCIAL

## 2025-08-25 ENCOUNTER — OFFICE VISIT (OUTPATIENT)
Dept: ORTHOPEDICS | Facility: CLINIC | Age: 60
End: 2025-08-25
Payer: COMMERCIAL

## 2025-08-25 VITALS
HEIGHT: 61 IN | SYSTOLIC BLOOD PRESSURE: 123 MMHG | OXYGEN SATURATION: 98 % | BODY MASS INDEX: 25.97 KG/M2 | WEIGHT: 137.56 LBS | TEMPERATURE: 98 F | HEART RATE: 82 BPM | DIASTOLIC BLOOD PRESSURE: 79 MMHG

## 2025-08-25 DIAGNOSIS — S46.011D TRAUMATIC ROTATOR CUFF TEAR, RIGHT, SUBSEQUENT ENCOUNTER: Primary | ICD-10-CM

## 2025-08-25 PROCEDURE — 99213 OFFICE O/P EST LOW 20 MIN: CPT | Mod: PBBFAC

## 2025-08-25 PROCEDURE — 3008F BODY MASS INDEX DOCD: CPT | Mod: CPTII,,, | Performed by: ORTHOPAEDIC SURGERY

## 2025-08-25 PROCEDURE — 3074F SYST BP LT 130 MM HG: CPT | Mod: CPTII,,, | Performed by: ORTHOPAEDIC SURGERY

## 2025-08-25 PROCEDURE — 1159F MED LIST DOCD IN RCRD: CPT | Mod: CPTII,,, | Performed by: ORTHOPAEDIC SURGERY

## 2025-08-25 PROCEDURE — 3078F DIAST BP <80 MM HG: CPT | Mod: CPTII,,, | Performed by: ORTHOPAEDIC SURGERY
